# Patient Record
Sex: FEMALE | Race: WHITE | URBAN - METROPOLITAN AREA
[De-identification: names, ages, dates, MRNs, and addresses within clinical notes are randomized per-mention and may not be internally consistent; named-entity substitution may affect disease eponyms.]

---

## 2022-01-14 ENCOUNTER — OFFICE VISIT (OUTPATIENT)
Dept: URBAN - METROPOLITAN AREA CLINIC 13 | Facility: CLINIC | Age: 67
End: 2022-01-14
Payer: COMMERCIAL

## 2022-01-14 DIAGNOSIS — E11.3313 TYPE 2 DIAB WITH MODERATE NONP RTNOP WITH MACULAR EDEMA, BI: Primary | ICD-10-CM

## 2022-01-14 PROCEDURE — 99214 OFFICE O/P EST MOD 30 MIN: CPT | Performed by: OPHTHALMOLOGY

## 2022-01-14 PROCEDURE — 92134 CPTRZ OPH DX IMG PST SGM RTA: CPT | Performed by: OPHTHALMOLOGY

## 2022-01-14 PROCEDURE — 92250 FUNDUS PHOTOGRAPHY W/I&R: CPT | Performed by: OPHTHALMOLOGY

## 2022-01-14 PROCEDURE — 92235 FLUORESCEIN ANGRPH MLTIFRAME: CPT | Performed by: OPHTHALMOLOGY

## 2022-01-14 ASSESSMENT — INTRAOCULAR PRESSURE
OS: 22
OD: 20

## 2022-01-14 NOTE — IMPRESSION/PLAN
Impression: Age-related nuclear cataract, bilateral: H25.13. Plan: Significant OD. Stable for cataract surgery from retina perspective.

## 2022-01-14 NOTE — IMPRESSION/PLAN
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 A1c between 9 and 10
s/p Avastin OS - 07/08/2020 OCT OU = IRF OS> / irreg FA OU = leak v/w DME OU, but no NVD/NVE OU Plan: DME OU but good VA and no PDR on exam/FA. Rec obs OD and obs OS for now and good systemic control (protocol V) May be a candidate for DR clinical trials at next visit if no sig DME


1m OCT OU reeval AV OU

## 2022-02-25 ENCOUNTER — OFFICE VISIT (OUTPATIENT)
Dept: URBAN - METROPOLITAN AREA CLINIC 13 | Facility: CLINIC | Age: 67
End: 2022-02-25
Payer: COMMERCIAL

## 2022-02-25 DIAGNOSIS — H25.13 AGE-RELATED NUCLEAR CATARACT, BILATERAL: ICD-10-CM

## 2022-02-25 PROCEDURE — 99213 OFFICE O/P EST LOW 20 MIN: CPT | Performed by: OPHTHALMOLOGY

## 2022-02-25 PROCEDURE — 92134 CPTRZ OPH DX IMG PST SGM RTA: CPT | Performed by: OPHTHALMOLOGY

## 2022-02-25 ASSESSMENT — INTRAOCULAR PRESSURE
OS: 13
OD: 12

## 2022-02-25 NOTE — IMPRESSION/PLAN
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 A1c between 9 and 10
s/p Avastin OS - 07/08/2020 OCT OU = IRF OS>OD  / 319 FA OU 01/14/2022 = leak c/w DME OU, but no NVD/NVE OU Plan: DME OU but good VA and no PDR on exam/imaging DME remains relatively mild. Rec obs OD and obs OS for now and good systemic control (protocol V) May be a candidate for DR clinical trials at next visit if no sig DME

1m OCT OU reeval AV OU

## 2022-09-21 ENCOUNTER — OFFICE VISIT (OUTPATIENT)
Dept: URBAN - METROPOLITAN AREA CLINIC 54 | Facility: CLINIC | Age: 67
End: 2022-09-21
Payer: COMMERCIAL

## 2022-09-21 DIAGNOSIS — E11.3313 TYPE 2 DIAB WITH MODERATE NONP RTNOP WITH MACULAR EDEMA, BI: Primary | ICD-10-CM

## 2022-09-21 DIAGNOSIS — Z96.1 PRESENCE OF INTRAOCULAR LENS: ICD-10-CM

## 2022-09-21 DIAGNOSIS — H25.12 AGE-RELATED NUCLEAR CATARACT, LEFT EYE: ICD-10-CM

## 2022-09-21 PROCEDURE — 92134 CPTRZ OPH DX IMG PST SGM RTA: CPT | Performed by: OPHTHALMOLOGY

## 2022-09-21 PROCEDURE — 99214 OFFICE O/P EST MOD 30 MIN: CPT | Performed by: OPHTHALMOLOGY

## 2022-09-21 ASSESSMENT — INTRAOCULAR PRESSURE
OD: 17
OS: 14

## 2022-09-21 NOTE — IMPRESSION/PLAN
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 A1c between 9 and 10
s/p Avastin OS - 07/08/2020 OCT OU =improving IRF OS>OD  / 310 FA OU 01/14/2022 = leak c/w DME OU, but no NVD/NVE OU Plan: Improving DME with obs OU. No NVE/NVD. Rec obs OU for now and good systemic control (protocol V) 3m OCT OU reeval AV OU

## 2023-02-10 ENCOUNTER — OFFICE VISIT (OUTPATIENT)
Dept: URBAN - METROPOLITAN AREA CLINIC 54 | Facility: CLINIC | Age: 68
End: 2023-02-10
Payer: COMMERCIAL

## 2023-02-10 DIAGNOSIS — H43.821 VITREOMACULAR ADHESION, RIGHT EYE: ICD-10-CM

## 2023-02-10 DIAGNOSIS — E11.3313 TYPE 2 DIAB WITH MODERATE NONP RTNOP WITH MACULAR EDEMA, BI: Primary | ICD-10-CM

## 2023-02-10 DIAGNOSIS — Z96.1 PRESENCE OF INTRAOCULAR LENS: ICD-10-CM

## 2023-02-10 DIAGNOSIS — H25.12 AGE-RELATED NUCLEAR CATARACT, LEFT EYE: ICD-10-CM

## 2023-02-10 PROCEDURE — 99212 OFFICE O/P EST SF 10 MIN: CPT | Performed by: OPHTHALMOLOGY

## 2023-02-10 PROCEDURE — 92134 CPTRZ OPH DX IMG PST SGM RTA: CPT | Performed by: OPHTHALMOLOGY

## 2023-02-10 ASSESSMENT — INTRAOCULAR PRESSURE
OS: 29
OD: 18

## 2023-02-10 NOTE — IMPRESSION/PLAN
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 Plan: Mild and in setting of good vision and some DME RBA's of surgery vs obs Would rec obs for now Monitor - may benefit from surgery with worsening

## 2023-02-10 NOTE — IMPRESSION/PLAN
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 A1c between 9 and 10
s/p Avastin OS - 07/08/2020 OCT OU = IRF OU with mild VMT OD  / 306 FA OU 01/14/2022 = leak c/w DME OU, but no NVD/NVE OU Plan: OD: fluctuating IRF with obs, no CI
OS: stable with obs and not CI
NO NVI/NVE/NVD Rec obs OU for now and good systemic control (protocol V) 3m OCT OU reeval AV OU

## 2023-05-05 ENCOUNTER — OFFICE VISIT (OUTPATIENT)
Dept: URBAN - METROPOLITAN AREA CLINIC 54 | Facility: CLINIC | Age: 68
End: 2023-05-05
Payer: COMMERCIAL

## 2023-05-05 DIAGNOSIS — H43.821 VITREOMACULAR ADHESION, RIGHT EYE: ICD-10-CM

## 2023-05-05 DIAGNOSIS — E11.3313 TYPE 2 DIABETES MELLITUS WITH MODERATE NONPROLIFERATIVE DIABETIC RETINOPATHY WITH MACULAR EDEMA, BILATERAL: Primary | ICD-10-CM

## 2023-05-05 DIAGNOSIS — Z96.1 PRESENCE OF INTRAOCULAR LENS: ICD-10-CM

## 2023-05-05 DIAGNOSIS — H25.12 AGE-RELATED NUCLEAR CATARACT, LEFT EYE: ICD-10-CM

## 2023-05-05 PROCEDURE — 92134 CPTRZ OPH DX IMG PST SGM RTA: CPT | Performed by: OPHTHALMOLOGY

## 2023-05-05 PROCEDURE — 99213 OFFICE O/P EST LOW 20 MIN: CPT | Performed by: OPHTHALMOLOGY

## 2023-05-05 ASSESSMENT — INTRAOCULAR PRESSURE
OS: 11
OD: 16

## 2023-05-05 NOTE — IMPRESSION/PLAN
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 A1c between 9 and 10
OCT OU = IRF OU with mild VMT OD  / 302 FA OU 01/14/2022 = leak c/w DME OU, but no NVD/NVE OU
s/p Avastin OS - 07/08/2020 Plan: OD: fluctuating IRF with obs, no CI
OS: stable IRF with obs and not CI No NVI/NVE/NVD RBA's of q3m maint inj vs obs d/w patient - she elects obs OU for now and good systemic control 3m OCT OU reeval AV OU

## 2023-08-11 ENCOUNTER — OFFICE VISIT (OUTPATIENT)
Dept: URBAN - METROPOLITAN AREA CLINIC 54 | Facility: CLINIC | Age: 68
End: 2023-08-11
Payer: COMMERCIAL

## 2023-08-11 DIAGNOSIS — H25.12 AGE-RELATED NUCLEAR CATARACT, LEFT EYE: ICD-10-CM

## 2023-08-11 DIAGNOSIS — E11.3313 TYPE 2 DIABETES MELLITUS WITH MODERATE NONPROLIFERATIVE DIABETIC RETINOPATHY WITH MACULAR EDEMA, BILATERAL: Primary | ICD-10-CM

## 2023-08-11 DIAGNOSIS — Z96.1 PRESENCE OF INTRAOCULAR LENS: ICD-10-CM

## 2023-08-11 DIAGNOSIS — H43.821 VITREOMACULAR ADHESION, RIGHT EYE: ICD-10-CM

## 2023-08-11 PROCEDURE — 67210 TREATMENT OF RETINAL LESION: CPT | Performed by: OPHTHALMOLOGY

## 2023-08-11 PROCEDURE — 92134 CPTRZ OPH DX IMG PST SGM RTA: CPT | Performed by: OPHTHALMOLOGY

## 2023-08-11 PROCEDURE — 99212 OFFICE O/P EST SF 10 MIN: CPT | Performed by: OPHTHALMOLOGY

## 2023-08-11 ASSESSMENT — INTRAOCULAR PRESSURE
OD: 17
OS: 16

## 2025-02-26 ENCOUNTER — APPOINTMENT (OUTPATIENT)
Dept: URBAN - METROPOLITAN AREA CLINIC 141 | Facility: CLINIC | Age: 70
Setting detail: DERMATOLOGY
End: 2025-02-26

## 2025-02-26 DIAGNOSIS — L82.1 OTHER SEBORRHEIC KERATOSIS: ICD-10-CM

## 2025-02-26 DIAGNOSIS — Z41.9 ENCOUNTER FOR PROCEDURE FOR PURPOSES OTHER THAN REMEDYING HEALTH STATE, UNSPECIFIED: ICD-10-CM

## 2025-02-26 DIAGNOSIS — L81.1 CHLOASMA: ICD-10-CM

## 2025-02-26 PROCEDURE — ? COSMETIC CONSULTATION: FACIAL

## 2025-02-26 PROCEDURE — 99203 OFFICE O/P NEW LOW 30 MIN: CPT

## 2025-02-26 PROCEDURE — ? COSMETIC CONSULTATION: CHEMICAL PEELS

## 2025-02-26 PROCEDURE — ? INVENTORY

## 2025-02-26 PROCEDURE — ? COUNSELING

## 2025-02-26 PROCEDURE — ? ADDITIONAL NOTES

## 2025-02-26 PROCEDURE — ? PHOTO-DOCUMENTATION

## 2025-02-26 PROCEDURE — ? PRESCRIPTION MEDICATION MANAGEMENT

## 2025-02-26 ASSESSMENT — LOCATION DETAILED DESCRIPTION DERM
LOCATION DETAILED: LEFT INFERIOR CENTRAL MALAR CHEEK
LOCATION DETAILED: LEFT LATERAL MALAR CHEEK

## 2025-02-26 ASSESSMENT — LOCATION ZONE DERM: LOCATION ZONE: FACE

## 2025-02-26 ASSESSMENT — LOCATION SIMPLE DESCRIPTION DERM: LOCATION SIMPLE: LEFT CHEEK

## 2025-02-26 NOTE — PROCEDURE: PRESCRIPTION MEDICATION MANAGEMENT
Continue Regimen: OTC hydroquinone cream ( aa nightly for 3 months then take 3 months break and repeat as needed)
Plan: Follow up in 3 months
Render In Strict Bullet Format?: No
Detail Level: Zone
Initiate Treatment: OTC retinol nightly

## 2025-02-26 NOTE — PROCEDURE: ADDITIONAL NOTES
Additional Notes: Patient will not be charged for today’s consultation. \\nPatient will schedule a facial with Princess in the future.
Render Risk Assessment In Note?: no
Detail Level: Simple

## 2025-02-26 NOTE — HPI: OTHER
Condition:: Mild Melasma on left cheek.
Please Describe Your Condition:: Patient is interested in cosmetic consultation.